# Patient Record
Sex: FEMALE | Race: BLACK OR AFRICAN AMERICAN | NOT HISPANIC OR LATINO | Employment: UNEMPLOYED | URBAN - METROPOLITAN AREA
[De-identification: names, ages, dates, MRNs, and addresses within clinical notes are randomized per-mention and may not be internally consistent; named-entity substitution may affect disease eponyms.]

---

## 2020-02-28 PROBLEM — N93.9 ABNORMAL UTERINE BLEEDING (AUB): Status: ACTIVE | Noted: 2019-01-24

## 2020-02-28 PROBLEM — R10.2 PELVIC PAIN IN FEMALE: Status: ACTIVE | Noted: 2019-03-20

## 2020-02-28 PROBLEM — D64.9 ANEMIA: Status: ACTIVE | Noted: 2017-10-24

## 2020-02-28 PROBLEM — D50.0 IRON DEFICIENCY ANEMIA DUE TO CHRONIC BLOOD LOSS: Status: ACTIVE | Noted: 2020-02-28

## 2020-02-28 PROBLEM — R19.00 PELVIC MASS: Status: ACTIVE | Noted: 2019-01-24

## 2020-02-28 PROBLEM — N92.0 MENORRHAGIA: Status: ACTIVE | Noted: 2017-11-01

## 2020-02-28 PROBLEM — N83.202 LEFT OVARIAN CYST: Status: ACTIVE | Noted: 2020-02-28

## 2020-02-28 PROBLEM — Z98.51 HISTORY OF BILATERAL TUBAL LIGATION: Status: ACTIVE | Noted: 2019-03-20

## 2021-03-04 PROBLEM — K13.0 CHEILOSIS: Status: ACTIVE | Noted: 2021-03-04

## 2023-10-30 ENCOUNTER — PATIENT OUTREACH (OUTPATIENT)
Dept: ADMINISTRATIVE | Facility: OTHER | Age: 33
End: 2023-10-30

## 2023-10-30 NOTE — PROGRESS NOTES
CHW - Initial Contact    This Community Health Worker completed the Social Determinant of Health questionnaire with patient during clinic visit today.    Pt identified barriers of most importance are: patient indemnified barriers of importance stress related to loss of love ones. And   Other related issues which she is working thought with her provider.   Referrals to community agencies completed with patient consent outside of [facility] include: No community referral needed during clinic visit  Referrals were put through [facility] - no:   Support and Services: No support services needed during clinic visit   Other information discussed the patient needs help with: patient discussed no other information during clinic visit    Follow up required: Yes  Follow-up Outreach - Due: 11/13/2023

## 2023-11-17 ENCOUNTER — PATIENT OUTREACH (OUTPATIENT)
Dept: ADMINISTRATIVE | Facility: OTHER | Age: 33
End: 2023-11-17

## 2023-11-17 NOTE — PROGRESS NOTES
CHW - Follow Up    This Community Health Worker completed a follow up visit with patient via telephone today.  Pt reported: patient reported she is doing okay no assistance is needed during phone interview. Regarding screening  SDOH.  Patient will reach out if assistance is needed in the future.  Community Health Worker provided: CHW spoke with patient she is doing okay   Follow up required: No  No future outreach task assigned       CHW - Case Closure    This Community Health Worker spoke to patient via telephone today.   Pt/ reported:  patient reported she is doing okay no assistance is needed during phone interview. Regarding screening  SDOH.  Patient will reach out if assistance is needed in the future.  Pt denied any additional needs at this time and agrees with episode closure at this time.    Provided patient with Community Health Worker's contact information and encouraged her to contact this Community Health Worker if additional needs arise.